# Patient Record
Sex: FEMALE | Race: OTHER | NOT HISPANIC OR LATINO | URBAN - METROPOLITAN AREA
[De-identification: names, ages, dates, MRNs, and addresses within clinical notes are randomized per-mention and may not be internally consistent; named-entity substitution may affect disease eponyms.]

---

## 2019-02-22 ENCOUNTER — EMERGENCY (EMERGENCY)
Facility: HOSPITAL | Age: 44
LOS: 1 days | Discharge: HOME | End: 2019-02-22
Attending: EMERGENCY MEDICINE | Admitting: EMERGENCY MEDICINE
Payer: SELF-PAY

## 2019-02-22 VITALS
DIASTOLIC BLOOD PRESSURE: 78 MMHG | SYSTOLIC BLOOD PRESSURE: 121 MMHG | HEART RATE: 90 BPM | RESPIRATION RATE: 16 BRPM | TEMPERATURE: 98 F | OXYGEN SATURATION: 96 %

## 2019-02-22 PROCEDURE — 99283 EMERGENCY DEPT VISIT LOW MDM: CPT | Mod: 25

## 2019-02-22 PROCEDURE — 73090 X-RAY EXAM OF FOREARM: CPT | Mod: 26,RT

## 2019-02-22 PROCEDURE — 73130 X-RAY EXAM OF HAND: CPT | Mod: 26,RT

## 2019-02-22 RX ORDER — IBUPROFEN 200 MG
600 TABLET ORAL ONCE
Qty: 0 | Refills: 0 | Status: COMPLETED | OUTPATIENT
Start: 2019-02-22 | End: 2019-02-22

## 2019-02-22 RX ORDER — OXYCODONE AND ACETAMINOPHEN 5; 325 MG/1; MG/1
1 TABLET ORAL ONCE
Qty: 0 | Refills: 0 | Status: DISCONTINUED | OUTPATIENT
Start: 2019-02-22 | End: 2019-02-22

## 2019-02-22 RX ORDER — ACETAMINOPHEN 500 MG
975 TABLET ORAL ONCE
Qty: 0 | Refills: 0 | Status: COMPLETED | OUTPATIENT
Start: 2019-02-22 | End: 2019-02-22

## 2019-02-22 RX ADMIN — Medication 975 MILLIGRAM(S): at 16:33

## 2019-02-22 RX ADMIN — Medication 600 MILLIGRAM(S): at 16:19

## 2019-02-22 RX ADMIN — Medication 600 MILLIGRAM(S): at 16:33

## 2019-02-22 NOTE — ED PROVIDER NOTE - DIAGNOSTIC INTERPRETATION
Interpreted by ED Physician:  Hand xray (3 view)- no fx, no jt effusion, no soft tissue swelling   Interpreted by ED Physician:  Forearm xray (2 view)- no fx, no jt effusion, no soft tissue swelling

## 2019-02-22 NOTE — ED PROVIDER NOTE - OBJECTIVE STATEMENT
43 y.o F with PMHx stomach ulcers presents to the ED with c/o right arm pain and right hip pain s/p mechanical fall today. Pt states she was visiting a college with her daughter when her heel got caught in the step and fell down the stairs. She reports landing on right arm and right hip. Describes pain as a "throbbing" sensation. States pain is worsened with walking, but is able to ambulate and bear weight.  Denies head pain or injury. Denies LOC,

## 2019-02-22 NOTE — ED ADULT NURSE NOTE - NSIMPLEMENTINTERV_GEN_ALL_ED
Implemented All Fall Risk Interventions:  [place] to call system. Call bell, personal items and telephone within reach. Instruct patient to call for assistance. Room bathroom lighting operational. Non-slip footwear when patient is off stretcher. Physically safe environment: no spills, clutter or unnecessary equipment. Stretcher in lowest position, wheels locked, appropriate side rails in place. Provide visual cue, wrist band, yellow gown, etc. Monitor gait and stability. Monitor for mental status changes and reorient to person, place, and time. Review medications for side effects contributing to fall risk. Reinforce activity limits and safety measures with patient and family.

## 2019-02-22 NOTE — ED PROVIDER NOTE - CLINICAL SUMMARY MEDICAL DECISION MAKING FREE TEXT BOX
43 y.o F presents to the ED with c/o right arm pain and right hip pain s/p mechanical trip and fall down the stairs today. Will obtain Xray of right forearm and right hand and give pain meds.

## 2019-02-22 NOTE — ED ADULT TRIAGE NOTE - CHIEF COMPLAINT QUOTE
Patient reports slipped and fell, landed on R hip and R arm. c/o R forearm pain. Denies head or neck injuries.

## 2019-02-22 NOTE — ED PROVIDER NOTE - MUSCULOSKELETAL, MLM
no spinal tenderness. mild tenderness at the occipital region on head no hematoma and did not hit head. No hip or femur tenderness. No pain with ROM of right hip. No tenderness to right side of "saddlebag" hip. Diffused upper forearm tenderness. No elbow tenderness. Focal tenderness to 5th MCP region.

## 2019-02-26 DIAGNOSIS — S40.021A CONTUSION OF RIGHT UPPER ARM, INITIAL ENCOUNTER: ICD-10-CM
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
## 2019-02-26 DIAGNOSIS — M79.601 PAIN IN RIGHT ARM: ICD-10-CM
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
